# Patient Record
(demographics unavailable — no encounter records)

---

## 2024-12-24 NOTE — HISTORY OF PRESENT ILLNESS
[FreeTextEntry1] : 54F RA and fibromyalgia (possible Sjogrens?), here to transfer care.  diagnosed by Dr. Schreiber in 2018 based on elevated Vectra with RA; was for the most part managed with diet/lifestyle changes. She has been hesitant to start on DMARD therapy. Did try Plaquenil for a few months, but it gave her significant reflux, and did not help her symptoms, so it was stopped.  tried low dose naltrexone for a few months, then ran out, currently not on it (prescribed by another doctor in [place]).  Joints never biggest issue. Her main issue is she gets upper trapezius muscle tension.  Goes for 2x/week massage, acupuncture monthly.  Occasional L. shoulder pain.  Initially did have morning stiffness with pain in wrists, years ago, but not so much now. Occasional intermittent swelling in PIP joints, with achiness. Not on day to day basis. She thinks it is weather related. No Raynauds. Occasional dysphagia every few months, most often with chicken. b/l hip achiness in cold, damp weather.  no knee or ankle pain. no swelling of any joint currently. no ocular inflammation. no dyspnea at rest or on exertion. no rashes currently. got a rash on neck 1.5 years ago- derm thought it was autoimmune related.  +dry eyes and +dry mouth. No unintentional weight loss, rather weight gain. No enlarged lymph nodes now- but it would occur in the past in neck. Takes Advil 400-600 mg PRN for sinus/head pressure- sees ENT in [facility].  Was being given oxazepam for muscle spasm and to help with sleep earlier- takes PRN now.   Family hx: no known autoimmune conditions.  [Weight Loss] : no weight loss [Malar Facial Rash] : no malar facial rash [Skin Lesions] : no lesions [Skin Nodules] : no skin nodules [Oral Ulcers] : no oral ulcers [Dry Mouth] : dry mouth [Dysphagia] : dysphagia [Arthralgias] : arthralgias [Morning Stiffness] : morning stiffness [Dyspnea] : no dyspnea [Visual Changes] : no visual changes [Eye Pain] : no eye pain [Eye Redness] : no eye redness [Dry Eyes] : dry eyes

## 2024-12-24 NOTE — ASSESSMENT
[FreeTextEntry1] : 54F RA and fibromyalgia (possible Sjogrens?), here to transfer care. Pertinent symptoms include +dry eyes, +dry mouth, upper trapezius muscle spasm;  she does get intermittent morning stiffness still in hands/hips- mainly in cold weather.  - will repeat autoimmune serologies including tests for RA, SLE, Sjogrens. - check borrelia abs. - patient was advised FMS is a diagnosis of exclusion, she does not have generalized tenderness on exam but she states she feels tender points in various places- she does have fatigue- will check TSH and I have also recommended aerobic exercise 30 minutes 5 days/week. She does feel better with walking.  - further plan to follow pending results of above labs.

## 2024-12-24 NOTE — REASON FOR VISIT
[Initial Eval - Existing Diagnosis] : an initial evaluation of an existing diagnosis [FreeTextEntry1] : EDWINA

## 2024-12-24 NOTE — PHYSICAL EXAM
[General Appearance - Alert] : alert [General Appearance - In No Acute Distress] : in no acute distress [General Appearance - Well Developed] : well developed [Sclera] : the sclera and conjunctiva were normal [Extraocular Movements] : extraocular movements were intact [Outer Ear] : the ears and nose were normal in appearance [Neck Appearance] : the appearance of the neck was normal [Exaggerated Use Of Accessory Muscles For Inspiration] : no accessory muscle use [Edema] : there was no peripheral edema [Musculoskeletal - Swelling] : no joint swelling seen [FreeTextEntry1] : no joint tenderness or synovitis; normal ROM of b/l shoulders, elbows, wrists. No generalized tenderness [Skin Color & Pigmentation] : normal skin color and pigmentation [] : no rash [Skin Lesions] : no skin lesions [No Focal Deficits] : no focal deficits [Oriented To Time, Place, And Person] : oriented to person, place, and time [Affect] : the affect was normal [Mood] : the mood was normal

## 2024-12-24 NOTE — REVIEW OF SYSTEMS
[Recent Weight Gain (___ Lbs)] : recent [unfilled] ~Ulb weight gain [Recent Weight Loss (___ Lbs)] : no recent weight loss [Dry Eyes] : dryness of the eyes [As Noted in HPI] : as noted in HPI [Arthralgias] : arthralgias [Negative] : Heme/Lymph